# Patient Record
Sex: MALE | Race: OTHER | HISPANIC OR LATINO | URBAN - METROPOLITAN AREA
[De-identification: names, ages, dates, MRNs, and addresses within clinical notes are randomized per-mention and may not be internally consistent; named-entity substitution may affect disease eponyms.]

---

## 2020-08-24 ENCOUNTER — EMERGENCY (EMERGENCY)
Facility: HOSPITAL | Age: 35
LOS: 1 days | Discharge: HOME | End: 2020-08-24
Admitting: EMERGENCY MEDICINE
Payer: MEDICAID

## 2020-08-24 VITALS
HEART RATE: 77 BPM | RESPIRATION RATE: 15 BRPM | HEIGHT: 65 IN | DIASTOLIC BLOOD PRESSURE: 78 MMHG | SYSTOLIC BLOOD PRESSURE: 130 MMHG | WEIGHT: 182.98 LBS | TEMPERATURE: 98 F | OXYGEN SATURATION: 97 %

## 2020-08-24 LAB — SARS-COV-2 RNA SPEC QL NAA+PROBE: SIGNIFICANT CHANGE UP

## 2020-08-24 PROCEDURE — 99283 EMERGENCY DEPT VISIT LOW MDM: CPT

## 2020-08-24 NOTE — ED PROVIDER NOTE - CARE PROVIDER_API CALL
Denver Pavon  [address], [place] [address]  Phone: [phone]  Fax: [phone]  Follow Up Time: 7-10 Days

## 2020-08-24 NOTE — ED PROVIDER NOTE - CLINICAL SUMMARY MEDICAL DECISION MAKING FREE TEXT BOX
Pt presents to the ED for COVID-19 testing due to exposure at work with a coworker that tested positive. Pt denies medical complaints or symptoms. No vital sign derangements. Will swab for COVID-19 and discharge. Pt agrees to receiving results electronically. Pt advised to quarantine for 2 weeks and get reswabbed for COVID-19 prior to returning to work. Pt also advised to follow up with primary care if needed.

## 2020-08-24 NOTE — ED PROVIDER NOTE - OBJECTIVE STATEMENT
34 yo M w/ no pertinent PMHx presents to the ED requesting COVID-19 testing. Pt has no medical complaints currently. Denies fever, chills, cough, SOB, chest pain, abdominal pain, N/V/D, throat pain. Pt reports direct contact with a person who has known COVID-19 at work and requires testing prior to returning to work.

## 2020-08-24 NOTE — ED PROVIDER NOTE - NSFOLLOWUPINSTRUCTIONS_ED_ALL_ED_FT
THE COVID-19 TEST   - Your results may take up to 1-2 days to become available   - If your result is positive, you will receive a phone call from one of our coronavirus specialists.    Please continue to self quarantine (home isolation) until your result is back and follow instructions accordingly    PLEASE CONTINUE TO ISOLATE FOR A TOTAL OF 14 DAYS AND BE SURE TO GET A REPEAT COVID SWAB IN 7-10 DAYS.    Return to the ER for any worsening symptoms, such as persistent fever >100.4F, shortness of breath, coughing up bloody sputum, chest pain, lethargy, and fainting    Please remember to wash your hands frequently (>20 seconds each time), avoid touching your face, and cover your cough/sneeze

## 2020-08-24 NOTE — ED PROVIDER NOTE - PATIENT PORTAL LINK FT
You can access the FollowMyHealth Patient Portal offered by [facility] by registering at the following website: [email or web address]. By joining [employer]’s FollowMyHealth portal, you will also be able to view your health information using other applications (apps) compatible with our system.

## 2020-08-28 DIAGNOSIS — Z20.828 CONTACT WITH AND (SUSPECTED) EXPOSURE TO OTHER VIRAL COMMUNICABLE DISEASES: ICD-10-CM
